# Patient Record
Sex: MALE | Race: OTHER | HISPANIC OR LATINO | URBAN - METROPOLITAN AREA
[De-identification: names, ages, dates, MRNs, and addresses within clinical notes are randomized per-mention and may not be internally consistent; named-entity substitution may affect disease eponyms.]

---

## 2024-09-22 ENCOUNTER — EMERGENCY (EMERGENCY)
Facility: HOSPITAL | Age: 89
LOS: 1 days | Discharge: HOME | End: 2024-09-22
Attending: STUDENT IN AN ORGANIZED HEALTH CARE EDUCATION/TRAINING PROGRAM | Admitting: STUDENT IN AN ORGANIZED HEALTH CARE EDUCATION/TRAINING PROGRAM
Payer: MEDICARE

## 2024-09-22 VITALS
HEART RATE: 61 BPM | RESPIRATION RATE: 16 BRPM | DIASTOLIC BLOOD PRESSURE: 80 MMHG | TEMPERATURE: 98 F | SYSTOLIC BLOOD PRESSURE: 149 MMHG | OXYGEN SATURATION: 96 %

## 2024-09-22 LAB
ALBUMIN SERPL ELPH-MCNC: 4.2 G/DL — SIGNIFICANT CHANGE UP (ref 3.3–5)
ALP SERPL-CCNC: 62 U/L — SIGNIFICANT CHANGE UP (ref 40–120)
ALT FLD-CCNC: 16 U/L — SIGNIFICANT CHANGE UP (ref 4–41)
ANION GAP SERPL CALC-SCNC: 17 MMOL/L — HIGH (ref 7–14)
AST SERPL-CCNC: 20 U/L — SIGNIFICANT CHANGE UP (ref 4–40)
BASOPHILS # BLD AUTO: 0.03 K/UL — SIGNIFICANT CHANGE UP (ref 0–0.2)
BASOPHILS NFR BLD AUTO: 0.4 % — SIGNIFICANT CHANGE UP (ref 0–2)
BILIRUB SERPL-MCNC: 0.2 MG/DL — SIGNIFICANT CHANGE UP (ref 0.2–1.2)
BUN SERPL-MCNC: 33 MG/DL — HIGH (ref 7–23)
CALCIUM SERPL-MCNC: 9.7 MG/DL — SIGNIFICANT CHANGE UP (ref 8.4–10.5)
CHLORIDE SERPL-SCNC: 102 MMOL/L — SIGNIFICANT CHANGE UP (ref 98–107)
CO2 SERPL-SCNC: 21 MMOL/L — LOW (ref 22–31)
CREAT SERPL-MCNC: 1.43 MG/DL — HIGH (ref 0.5–1.3)
EGFR: 47 ML/MIN/1.73M2 — LOW
EOSINOPHIL # BLD AUTO: 0.53 K/UL — HIGH (ref 0–0.5)
EOSINOPHIL NFR BLD AUTO: 6.5 % — HIGH (ref 0–6)
GLUCOSE SERPL-MCNC: 217 MG/DL — HIGH (ref 70–99)
HCT VFR BLD CALC: 35.9 % — LOW (ref 39–50)
HGB BLD-MCNC: 11.8 G/DL — LOW (ref 13–17)
IANC: 5.7 K/UL — SIGNIFICANT CHANGE UP (ref 1.8–7.4)
IMM GRANULOCYTES NFR BLD AUTO: 0.6 % — SIGNIFICANT CHANGE UP (ref 0–0.9)
LYMPHOCYTES # BLD AUTO: 1.27 K/UL — SIGNIFICANT CHANGE UP (ref 1–3.3)
LYMPHOCYTES # BLD AUTO: 15.5 % — SIGNIFICANT CHANGE UP (ref 13–44)
MAGNESIUM SERPL-MCNC: 2 MG/DL — SIGNIFICANT CHANGE UP (ref 1.6–2.6)
MCHC RBC-ENTMCNC: 28.1 PG — SIGNIFICANT CHANGE UP (ref 27–34)
MCHC RBC-ENTMCNC: 32.9 GM/DL — SIGNIFICANT CHANGE UP (ref 32–36)
MCV RBC AUTO: 85.5 FL — SIGNIFICANT CHANGE UP (ref 80–100)
MONOCYTES # BLD AUTO: 0.61 K/UL — SIGNIFICANT CHANGE UP (ref 0–0.9)
MONOCYTES NFR BLD AUTO: 7.4 % — SIGNIFICANT CHANGE UP (ref 2–14)
NEUTROPHILS # BLD AUTO: 5.7 K/UL — SIGNIFICANT CHANGE UP (ref 1.8–7.4)
NEUTROPHILS NFR BLD AUTO: 69.6 % — SIGNIFICANT CHANGE UP (ref 43–77)
NRBC # BLD: 0 /100 WBCS — SIGNIFICANT CHANGE UP (ref 0–0)
NRBC # FLD: 0 K/UL — SIGNIFICANT CHANGE UP (ref 0–0)
PHOSPHATE SERPL-MCNC: 2 MG/DL — LOW (ref 2.5–4.5)
PLATELET # BLD AUTO: 197 K/UL — SIGNIFICANT CHANGE UP (ref 150–400)
POTASSIUM SERPL-MCNC: 4 MMOL/L — SIGNIFICANT CHANGE UP (ref 3.5–5.3)
POTASSIUM SERPL-SCNC: 4 MMOL/L — SIGNIFICANT CHANGE UP (ref 3.5–5.3)
PROT SERPL-MCNC: 7.4 G/DL — SIGNIFICANT CHANGE UP (ref 6–8.3)
RBC # BLD: 4.2 M/UL — SIGNIFICANT CHANGE UP (ref 4.2–5.8)
RBC # FLD: 13 % — SIGNIFICANT CHANGE UP (ref 10.3–14.5)
SODIUM SERPL-SCNC: 140 MMOL/L — SIGNIFICANT CHANGE UP (ref 135–145)
WBC # BLD: 8.19 K/UL — SIGNIFICANT CHANGE UP (ref 3.8–10.5)
WBC # FLD AUTO: 8.19 K/UL — SIGNIFICANT CHANGE UP (ref 3.8–10.5)

## 2024-09-22 PROCEDURE — 72100 X-RAY EXAM L-S SPINE 2/3 VWS: CPT | Mod: 26

## 2024-09-22 PROCEDURE — 72020 X-RAY EXAM OF SPINE 1 VIEW: CPT | Mod: 26

## 2024-09-22 PROCEDURE — 73522 X-RAY EXAM HIPS BI 3-4 VIEWS: CPT | Mod: 26

## 2024-09-22 PROCEDURE — 72125 CT NECK SPINE W/O DYE: CPT | Mod: 26,MC

## 2024-09-22 PROCEDURE — 71046 X-RAY EXAM CHEST 2 VIEWS: CPT | Mod: 26

## 2024-09-22 PROCEDURE — 70450 CT HEAD/BRAIN W/O DYE: CPT | Mod: 26,MC

## 2024-09-22 PROCEDURE — 99284 EMERGENCY DEPT VISIT MOD MDM: CPT

## 2024-09-22 RX ORDER — ASPIRIN 81 MG
1 TABLET, DELAYED RELEASE (ENTERIC COATED) ORAL
Refills: 0 | DISCHARGE

## 2024-09-22 RX ORDER — ACETAMINOPHEN 325 MG/1
1000 TABLET ORAL ONCE
Refills: 0 | Status: COMPLETED | OUTPATIENT
Start: 2024-09-22 | End: 2024-09-22

## 2024-09-22 RX ADMIN — ACETAMINOPHEN 400 MILLIGRAM(S): 325 TABLET ORAL at 21:23

## 2024-09-22 RX ADMIN — Medication 500 MILLILITER(S): at 21:28

## 2024-09-22 NOTE — ED PROVIDER NOTE - PHYSICAL EXAMINATION
General: Patient in NAD, in C-collar   HEENT: NCAT, EOMI, no oral lesions  CV: S1+S2, no m/r/g appreciated   Lungs: No respiratory distress, CTAB  Abd: Soft, nontender, no guarding, no rebound tenderness, + bowel sounds   : No suprapubic tenderness  Neuro: AOx4, in C-collar No focal cranial nerve deficits, muscle strength intact equal all extremities, sensation intact.    Ext: No cyanosis, no edema  Skin: No rash, no phlebitis

## 2024-09-22 NOTE — ED ADULT NURSE NOTE - NSFALLHARMRISKINTERV_ED_ALL_ED
Assistance OOB with selected safe patient handling equipment if applicable/Assistance with ambulation/Communicate risk of Fall with Harm to all staff, patient, and family/Monitor gait and stability/Provide visual cue: red socks, yellow wristband, yellow gown, etc/Reinforce activity limits and safety measures with patient and family/Bed in lowest position, wheels locked, appropriate side rails in place/Call bell, personal items and telephone in reach/Instruct patient to call for assistance before getting out of bed/chair/stretcher/Non-slip footwear applied when patient is off stretcher/[place] to call system/Physically safe environment - no spills, clutter or unnecessary equipment/Purposeful Proactive Rounding/Room/bathroom lighting operational, light cord in reach

## 2024-09-22 NOTE — ED ADULT TRIAGE NOTE - CHIEF COMPLAINT QUOTE
arrives s/p fall, was at a men's retreat reports slipped on soap while in the shower. c/o pain to neck and head. coming from urgent care, arrives in soft C collar. + hitting head. denies LOC or use of blood thinners. hx of HTN, DM II. .

## 2024-09-22 NOTE — ED ADULT NURSE NOTE - OBJECTIVE STATEMENT
Patient slipped and fell in the shower around 730am, patient states that he did hit he head denies LOC, complains of neck pain,

## 2024-09-22 NOTE — ED PROVIDER NOTE - PATIENT PORTAL LINK FT
You can access the FollowMyHealth Patient Portal offered by [facility] by registering at the following website: [email or web address]. By joining [employer]’s FollowMyHealth portal, you will also be able to view your health information using other applications (apps) compatible with our system.

## 2024-09-22 NOTE — ED PROVIDER NOTE - CLINICAL SUMMARY MEDICAL DECISION MAKING FREE TEXT BOX
89M hx DM and prior hemorrhagic stroke coming in 12 hours after a slip in the shower. Mechanical fall w/o concern for syncopal or presyncopal episodes. Trauma eval CT Head and neck noncon rule out brain bleed cervical fracture, XR chest, XR Thoracic and L spine, XR hip. EKG eval for any underlying arrhythmia, CBC CMP Mg Phos, 500cc LR bolus and Ofirmev for pain.

## 2024-09-22 NOTE — ED PROVIDER NOTE - ATTENDING CONTRIBUTION TO CARE
Jose Cruz Bee DO:  patient seen and evaluated with the resident.  I was present for key portions of the History & Physical, and I agree with the Impression & Plan. 89yo m pmh htn, hld, ICH, pw fall. PW family bedside via translation collateral, official  declined.  Reports today was in the shower, slipped backwards and struck head.  Denies LOC,/weakness/CP.  Denies pain in neck.  Denies cough, congestion, vision change, paresthesias.  Patient arrives HDS, well-appearing, neurovascular intact. Patient was HDS, well-appearing, neurovascular intact, moving all extremities.  No midline spinal TTP.  No evidence of gross deformity.  Eval for ICH.  Very low suspicion.  Labs, imaging, reassess.  Likely DC with outpatient follow-up.  Patient arrives in c-collar, will maintain until imaging.

## 2024-09-22 NOTE — ED PROVIDER NOTE - NSFOLLOWUPINSTRUCTIONS_ED_ALL_ED_FT
You were seen in the ED after a fall. CT scan of your head and neck did not reveal any brain bleeds or fractures of the spine. Xray of the spine and hips did not show any fractures. Please use cold compresses and take tylenol for your neck pain. Please follow up with your primary care doctor in 1 week. If you develop worsening headaches, confusion, loss of consciousness, numbness or tingling of your extremities, weakness, please return to the ED. You were seen in the ED after a fall. CT scan of your head and neck did not reveal any brain bleeds or fractures of the spine. Xray of the spine and hips did not show any fractures. Please use warm compresses and take tylenol as needed for your neck pain. Please follow up with your primary care doctor in 1 week. If you develop worsening headaches, confusion, loss of consciousness, numbness or tingling of your extremities, weakness, please return to the ED.

## 2024-09-22 NOTE — ED PROVIDER NOTE - NS ED ROS FT
Constitutional: No fevers, chills, weight loss or fatigue   Skin: No rash, no phlebitis	  Eyes: No discharge	  ENMT: No sore throat, oral thrush, ulcers or exudate  Respiratory: No cough, no SOB  Cardiovascular:  No chest pain, palpitations or edema   Gastrointestinal: No pain, nausea, vomiting, diarrhea or constipation	  Genitourinary: No dysuria, discharge or flank pain  MSK: Neck pain, but no hip pain back pain or headaches   Neurological: No HA, no weakness, no seizures, no AMS

## 2024-09-22 NOTE — ED PROVIDER NOTE - IV ALTEPLASE DOOR HIDDEN
Health Maintenance Summary     Topic Due On Due Status Completed On Postpone Until Reason    IMMUNIZATION - DTaP/Tdap/Td Feb 26, 2019 Not Due Feb 26, 2009      Immunization-Influenza Sep 1, 2017 Postponed Oct 21, 2011 Apr 1, 2018 Patient Refused    Depression Screening Nov 20, 1984 Overdue             Patient is due for topics as listed above, he wishes to discuss with provider .      Patient previously signed Declination    Recent PHQ 2/9 Score    PHQ 2:  Date PHQ 2 Score   12/7/2017 5       PHQ 9:  Date PHQ 9 Score   12/7/2017 17       Most Recent DAQUAN 7 Score       Date DAQUAN 7 Score   12/7/2017 18       .Confirmed with patient that a detailed message can be left on voice mail.         show

## 2024-09-22 NOTE — ED PROVIDER NOTE - OBJECTIVE STATEMENT
89M w/ DM and prior hemorrhagic stroke coming in after a mechanical fall 12 hours ago. He was at a men's retreat over the weekend. He was showering this morning and slipped on soapy floor. He his the back of his head, neck, and back against the wall and slid down to the ground and landed on his buttocks. He denies any dizziness or light headedness leading up to the episodes or any loss of consciousness. He endorses neck pain after the fall but denies headaches or any pain anywhere else. He denies any weakness, numbness, dizziness, changes in vision.

## 2024-09-23 VITALS
OXYGEN SATURATION: 99 % | SYSTOLIC BLOOD PRESSURE: 142 MMHG | TEMPERATURE: 98 F | RESPIRATION RATE: 18 BRPM | DIASTOLIC BLOOD PRESSURE: 68 MMHG | HEART RATE: 64 BPM

## 2024-09-23 NOTE — ED ADULT NURSE REASSESSMENT NOTE - NS ED NURSE REASSESS COMMENT FT1
break coverage rn. received report from SISSY murrell. pt A&Ox4. provided discharge paperwork. denies pain at this time, ambulatory with steady gait. IV removed. pt provided wheelchair